# Patient Record
Sex: MALE | Race: WHITE | NOT HISPANIC OR LATINO | Employment: UNEMPLOYED | URBAN - NONMETROPOLITAN AREA
[De-identification: names, ages, dates, MRNs, and addresses within clinical notes are randomized per-mention and may not be internally consistent; named-entity substitution may affect disease eponyms.]

---
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
## 2023-10-04 NOTE — PROGRESS NOTES
Establish and follow-up with a primary care physician in 1 to 2 days for reevaluation. Call for an appointment  Take prednisone as described for inflammation pain and swelling  Take Robaxin muscle relaxants prescribed and directed. No drink or drive while taking  Take naproxen as prescribed for inflammation pain and swelling  Return to the emergency department immediately pain vision nausea vomiting lightheaded or any worsening symptoms. Subjective   Mani Hackett is a 13 y.o. male. Chest wall pain.    Patient denies fatigue, fever, chills, or cough. States that pain is worsened with movement and deep breathing. States that he feels somewhat short of breath when he tries to take a deep breath because of the pain he feels between his ribs. These symptoms began yesterday after he was lifting and moving heavy furniture.    Muscle Pain   This is a new problem. The current episode started yesterday. The problem occurs constantly. The problem has been waxing and waning since onset. The pain occurs in the context of recent physical stress. The pain is present in the left ribs. The pain is medium. The symptoms are aggravated by any movement (pain aggravated by deep breathing and twists and turns ). Associated symptoms include chest pain and shortness of breath. Pertinent negatives include no fatigue. Past treatments include OTC NSAID (took ibuprofen and had some improvement but pain did not completely go away). The treatment provided mild relief. There is no swelling present.        The following portions of the patient's history were reviewed and updated as appropriate: allergies, current medications, past family history, past medical history, past social history, past surgical history and problem list.    Review of Systems   Constitutional: Negative for fatigue.   Respiratory: Positive for shortness of breath.    Cardiovascular: Positive for chest pain.       Objective   Physical Exam   Constitutional: He is oriented to person, place, and time. He appears well-developed and well-nourished.   HENT:   Head: Normocephalic.   Eyes: Pupils are equal, round, and reactive to light.   Neck: Normal range of motion. Neck supple.   Cardiovascular: Normal rate and regular rhythm.   Pulmonary/Chest: Effort normal and breath sounds normal. No respiratory distress. He has no wheezes. He has no rales. Chest wall is not dull to percussion. He exhibits tenderness  (located on left chest wall around 4th and 5th ICS). He exhibits no mass, no bony tenderness, no laceration, no crepitus, no edema, no deformity, no swelling and no retraction.   Abdominal: Soft.   Musculoskeletal: Normal range of motion.   Neurological: He is alert and oriented to person, place, and time.   Skin: Skin is warm, dry and intact.   Psychiatric: He has a normal mood and affect. His speech is normal and behavior is normal. Thought content normal.         Assessment/Plan   Mani was seen today for muscle pain.    Diagnoses and all orders for this visit:    Chest wall muscle strain, initial encounter  -     ibuprofen (ADVIL,MOTRIN) 800 MG tablet; Take 1 tablet by mouth Every 6 (Six) Hours As Needed for Moderate Pain  for up to 30 days.    Patient was given ibuprofen to take as needed for chest wall pain as well as education about using good body mechanics when lifting to prevent injury.  School excuse given for today. I did not feel the pain was cardiac or pulmonary related due to the fact that it started yesterday when he was lifting and moving heavy furniture. Patient instructed to call for further instructions if symptoms do not improve or worsen in 2 weeks.            This document has been electronically signed by CAROLINE Maldonado on  March 15, 2019 9:48 AM